# Patient Record
Sex: FEMALE | Race: WHITE | URBAN - METROPOLITAN AREA
[De-identification: names, ages, dates, MRNs, and addresses within clinical notes are randomized per-mention and may not be internally consistent; named-entity substitution may affect disease eponyms.]

---

## 2021-03-24 ENCOUNTER — TELEPHONE ENCOUNTER (OUTPATIENT)
Dept: URBAN - METROPOLITAN AREA SURGERY CENTER 30 | Facility: SURGERY CENTER | Age: 71
End: 2021-03-24

## 2021-03-30 ENCOUNTER — CLAIMS CREATED FROM THE CLAIM WINDOW (OUTPATIENT)
Dept: URBAN - METROPOLITAN AREA CLINIC 118 | Facility: CLINIC | Age: 71
End: 2021-03-30
Payer: COMMERCIAL

## 2021-03-30 ENCOUNTER — LAB OUTSIDE AN ENCOUNTER (OUTPATIENT)
Dept: URBAN - METROPOLITAN AREA CLINIC 118 | Facility: CLINIC | Age: 71
End: 2021-03-30

## 2021-03-30 DIAGNOSIS — K21.9 GASTROESOPHAGEAL REFLUX DISEASE WITHOUT ESOPHAGITIS: ICD-10-CM

## 2021-03-30 DIAGNOSIS — R14.0 ABDOMINAL BLOATING: ICD-10-CM

## 2021-03-30 DIAGNOSIS — R10.12 POSTPRANDIAL ABDOMINAL PAIN IN LEFT UPPER QUADRANT: ICD-10-CM

## 2021-03-30 DIAGNOSIS — I73.89 OTHER SPECIFIED PERIPHERAL VASCULAR DISEASES: ICD-10-CM

## 2021-03-30 PROCEDURE — 99244 OFF/OP CNSLTJ NEW/EST MOD 40: CPT | Performed by: INTERNAL MEDICINE

## 2021-03-30 PROCEDURE — 99204 OFFICE O/P NEW MOD 45 MIN: CPT | Performed by: INTERNAL MEDICINE

## 2021-03-30 RX ORDER — ALPRAZOLAM 0.25 MG/1
(SCHEDULE IV DRUG) TABLET ORAL
Qty: 30 DELAYED RELEASE TABLET | Status: ACTIVE | COMMUNITY

## 2021-03-30 RX ORDER — METOPROLOL TARTRATE 25 MG/1
TABLET ORAL
Qty: 180 DELAYED RELEASE TABLET | Status: ACTIVE | COMMUNITY

## 2021-03-30 RX ORDER — CHLORHEXIDINE GLUCONATE 1.2 MG/ML
RINSE ORAL
Qty: 473 MILLILITER | Status: ACTIVE | COMMUNITY

## 2021-03-30 RX ORDER — OMEPRAZOLE 20 MG/1
1 CAPSULE 30 MINUTES BEFORE MORNING MEAL CAPSULE, DELAYED RELEASE ORAL ONCE A DAY
Status: ACTIVE | COMMUNITY

## 2021-03-30 RX ORDER — ATORVASTATIN CALCIUM, FILM COATED 80 MG/1
TABLET ORAL
Qty: 90 DELAYED RELEASE TABLET | Status: ACTIVE | COMMUNITY

## 2021-03-30 RX ORDER — ASPIRIN 81 MG/1
1 TABLET TABLET, COATED ORAL ONCE A DAY
Status: ACTIVE | COMMUNITY

## 2021-03-30 RX ORDER — VORTIOXETINE 10 MG/1
TABLET, FILM COATED ORAL
Qty: 90 DELAYED RELEASE TABLET | Status: ACTIVE | COMMUNITY

## 2021-03-30 RX ORDER — PANTOPRAZOLE SODIUM 40 MG/1
TABLET, DELAYED RELEASE ORAL
Qty: 180 DELAYED RELEASE TABLET | Status: ACTIVE | COMMUNITY

## 2021-03-30 NOTE — HPI-TODAY'S VISIT:
The patient is a 71 yo female seen with her daughter for a chronic hx of abdominal pain, nausea, and gastritis.  She is referred by Dr Zaragoza for a second opinion for the same.  She has previously seen Dr Cali and had significant testing over the last several years (EGD, Colon, GES, MRI, CT scans).  No significant lesions other than polyps, gastritis, and diverticulosis have been seen.  She states her primary complaints are post-prandial pain with nausea and bloating for "years."  She has gained 10 pounds in the past 6 months, but has a hx of PAD (CAD).  She has regular BMs with only occasional diarrhea, but no constipation, and dietary modification has not helped.  She is on BID protonix with PRN omeprazole, without relief, and states her symptoms were much better on zantac (has not tried pepcid).  She has no family hx of IBD, colon cancer, gastric cancer; her mother and brother had colon polyps.  She does have some food allergies (nuts, chocolate) but her only reaction is mouth sores.  She is former smoker.  Despite her chronic nausea, there is no vomiting, and no blood in the stools, dysphagia, or odynophagia.  Her abdominal pain is diffuse, and is associated with eating/bloating.  She has not tried antispasmodic therapy.

## 2021-03-31 PROBLEM — 840580004: Status: ACTIVE | Noted: 2021-03-30

## 2021-03-31 PROBLEM — 266435005: Status: ACTIVE | Noted: 2021-03-31

## 2021-04-08 ENCOUNTER — OFFICE VISIT (OUTPATIENT)
Dept: URBAN - METROPOLITAN AREA CLINIC 118 | Facility: CLINIC | Age: 71
End: 2021-04-08
Payer: COMMERCIAL

## 2021-04-08 DIAGNOSIS — R10.84 GENERALIZED ABDOMINAL PAIN: ICD-10-CM

## 2021-04-08 DIAGNOSIS — R11.2 NON-INTRACTABLE VOMITING WITH NAUSEA, UNSPECIFIED VOMITING TYPE: ICD-10-CM

## 2021-04-08 DIAGNOSIS — R19.7 DIARRHEA OF PRESUMED INFECTIOUS ORIGIN: ICD-10-CM

## 2021-04-08 PROCEDURE — 99204 OFFICE O/P NEW MOD 45 MIN: CPT | Performed by: INTERNAL MEDICINE

## 2021-04-08 RX ORDER — CHLORHEXIDINE GLUCONATE 1.2 MG/ML
RINSE ORAL
Qty: 473 MILLILITER | Status: ACTIVE | COMMUNITY

## 2021-04-08 RX ORDER — OMEPRAZOLE 20 MG/1
1 CAPSULE 30 MINUTES BEFORE MORNING MEAL CAPSULE, DELAYED RELEASE ORAL ONCE A DAY
Status: DISCONTINUED | COMMUNITY

## 2021-04-08 RX ORDER — ASPIRIN 81 MG/1
1 TABLET TABLET, COATED ORAL ONCE A DAY
Status: ACTIVE | COMMUNITY

## 2021-04-08 RX ORDER — VORTIOXETINE 10 MG/1
TABLET, FILM COATED ORAL
Qty: 90 DELAYED RELEASE TABLET | Status: ACTIVE | COMMUNITY

## 2021-04-08 RX ORDER — ATORVASTATIN CALCIUM, FILM COATED 80 MG/1
TABLET ORAL
Qty: 90 DELAYED RELEASE TABLET | Status: ACTIVE | COMMUNITY

## 2021-04-08 RX ORDER — METOPROLOL TARTRATE 25 MG/1
TABLET ORAL
Qty: 180 DELAYED RELEASE TABLET | Status: ACTIVE | COMMUNITY

## 2021-04-08 RX ORDER — ALPRAZOLAM 0.25 MG/1
(SCHEDULE IV DRUG) TABLET ORAL
Qty: 30 DELAYED RELEASE TABLET | Status: ACTIVE | COMMUNITY

## 2021-04-08 RX ORDER — DICYCLOMINE HYDROCHLORIDE 10 MG/1
1 TABLET CAPSULE ORAL
Qty: 90 TABLET | Refills: 5 | OUTPATIENT
Start: 2021-04-08 | End: 2021-10-05

## 2021-04-08 RX ORDER — PANTOPRAZOLE SODIUM 40 MG/1
TABLET, DELAYED RELEASE ORAL
Qty: 180 DELAYED RELEASE TABLET | Status: ACTIVE | COMMUNITY

## 2021-04-08 RX ORDER — FAMOTIDINE 40 MG/1
1 TABLET AT BEDTIME TABLET, FILM COATED ORAL ONCE A DAY
Qty: 30 TABLET | Refills: 5 | OUTPATIENT
Start: 2021-04-08

## 2021-04-08 NOTE — HPI-TODAY'S VISIT:
The patient is following up after her recent CT scan which was unremarkable other than diverticulosis, a small hiatal hernia, and mild to moderate atherosclerosis.  She has lost 6 pounds and says she continues to have chronic nausea diffuse abdominal cramps, and intermittent watery diarrhea.  She stopped all PPI  antacid therapy and went on daily Pepcid, and this appears to control her reflux at least as well, and has not worsened the diarrhea.  In addition she stopped her atorvastatin after discussing this with her prescribing physician.  A recent CBC as well as urinalysis at her primary care was within normal limits.  A long discussion was held with the patient about functional bowel disorders, anxiety/depression, and organic bowel disease, given the significant number of negative test she has had by both us and previous gastroenterologist.  She has never tried medication for functional bowel disorder; she thinks her anxiety is relatively well controlled on her Trintellix and as needed Xanax.

## 2021-04-13 ENCOUNTER — TELEPHONE ENCOUNTER (OUTPATIENT)
Dept: URBAN - METROPOLITAN AREA CLINIC 118 | Facility: CLINIC | Age: 71
End: 2021-04-13

## 2021-04-13 LAB
ALBUMIN: 4.6
ALKALINE PHOSPHATASE: 126
ALT (SGPT): 12
AST (SGOT): 24
BASO (ABSOLUTE): 0
BASOS: 0
BILIRUBIN, DIRECT: 0.19
BILIRUBIN, TOTAL: 0.6
C DIFFICILE TOXINS A+B, EIA: NEGATIVE
CAMPYLOBACTER CULTURE: (no result)
E COLI SHIGA TOXIN EIA: NEGATIVE
EOS (ABSOLUTE): 0.1
EOS: 2
HEMATOCRIT: 41.9
HEMATOLOGY COMMENTS:: (no result)
HEMOGLOBIN: 13.6
IMMATURE CELLS: (no result)
IMMATURE GRANS (ABS): 0
IMMATURE GRANULOCYTES: 0
LIPASE: 12
LYMPHS (ABSOLUTE): 2.4
LYMPHS: 33
Lab: (no result)
Lab: (no result)
MCH: 28.8
MCHC: 32.5
MCV: 89
MONOCYTES(ABSOLUTE): 0.8
MONOCYTES: 11
NEUTROPHILS (ABSOLUTE): 3.9
NEUTROPHILS: 54
NRBC: (no result)
PLATELETS: 229
PROTEIN, TOTAL: 6.9
RBC: 4.73
RDW: 12.2
SALMONELLA/SHIGELLA SCREEN: (no result)
WBC: 7.2

## 2021-04-13 RX ORDER — DICYCLOMINE HYDROCHLORIDE 10 MG/1
1 TABLET CAPSULE ORAL
Qty: 90 TABLET | Refills: 5
Start: 2021-04-08

## 2021-04-13 RX ORDER — FAMOTIDINE 40 MG/1
1 TABLET AT BEDTIME TABLET, FILM COATED ORAL ONCE A DAY
Qty: 30 TABLET | Refills: 5
Start: 2021-04-08

## 2021-04-14 ENCOUNTER — OFFICE VISIT (OUTPATIENT)
Dept: URBAN - METROPOLITAN AREA CLINIC 118 | Facility: CLINIC | Age: 71
End: 2021-04-14

## 2021-11-12 ENCOUNTER — ERX REFILL RESPONSE (OUTPATIENT)
Dept: URBAN - METROPOLITAN AREA CLINIC 118 | Facility: CLINIC | Age: 71
End: 2021-11-12

## 2021-11-12 RX ORDER — FAMOTIDINE 40 MG/1
TAKE 1 TABLET BY MOUTH EVERYDAY AT BEDTIME TABLET, FILM COATED ORAL
Qty: 30 TABLET | Refills: 6 | OUTPATIENT

## 2021-11-12 RX ORDER — FAMOTIDINE 40 MG/1
TAKE 1 TABLET BY MOUTH EVERYDAY AT BEDTIME TABLET, FILM COATED ORAL ONCE A DAY
Qty: 30 TABLET | Refills: 6 | OUTPATIENT

## 2021-11-21 ENCOUNTER — ERX REFILL RESPONSE (OUTPATIENT)
Dept: URBAN - METROPOLITAN AREA CLINIC 118 | Facility: CLINIC | Age: 71
End: 2021-11-21

## 2021-11-21 RX ORDER — DICYCLOMINE HYDROCHLORIDE 10 MG/1
TAKE 1 CAPSULE BY MOUTH THREE TIMES A DAY AS NEEDED CAPSULE ORAL
Qty: 90 CAPSULE | Refills: 6 | OUTPATIENT

## 2021-11-21 RX ORDER — DICYCLOMINE HYDROCHLORIDE 10 MG/1
1 TABLET CAPSULE ORAL
Qty: 90 TABLET | Refills: 5 | OUTPATIENT

## 2023-07-19 ENCOUNTER — OFFICE VISIT (OUTPATIENT)
Dept: URBAN - METROPOLITAN AREA CLINIC 118 | Facility: CLINIC | Age: 73
End: 2023-07-19

## 2023-10-25 ENCOUNTER — OFFICE VISIT (OUTPATIENT)
Dept: URBAN - METROPOLITAN AREA CLINIC 118 | Facility: CLINIC | Age: 73
End: 2023-10-25

## 2023-12-18 ENCOUNTER — DASHBOARD ENCOUNTERS (OUTPATIENT)
Age: 73
End: 2023-12-18

## 2023-12-18 ENCOUNTER — LAB OUTSIDE AN ENCOUNTER (OUTPATIENT)
Dept: URBAN - METROPOLITAN AREA CLINIC 118 | Facility: CLINIC | Age: 73
End: 2023-12-18

## 2023-12-18 ENCOUNTER — OFFICE VISIT (OUTPATIENT)
Dept: URBAN - METROPOLITAN AREA CLINIC 118 | Facility: CLINIC | Age: 73
End: 2023-12-18
Payer: COMMERCIAL

## 2023-12-18 VITALS
SYSTOLIC BLOOD PRESSURE: 148 MMHG | TEMPERATURE: 97.3 F | WEIGHT: 194.6 LBS | HEART RATE: 58 BPM | HEIGHT: 60 IN | BODY MASS INDEX: 38.21 KG/M2 | DIASTOLIC BLOOD PRESSURE: 66 MMHG

## 2023-12-18 DIAGNOSIS — K31.89 INTESTINAL METAPLASIA OF GASTRIC MUCOSA: ICD-10-CM

## 2023-12-18 DIAGNOSIS — Z86.010 PERSONAL HISTORY OF COLON POLYPS: ICD-10-CM

## 2023-12-18 DIAGNOSIS — R11.0 CHRONIC NAUSEA: ICD-10-CM

## 2023-12-18 DIAGNOSIS — R10.13 DYSPEPSIA: ICD-10-CM

## 2023-12-18 PROCEDURE — 99215 OFFICE O/P EST HI 40 MIN: CPT | Performed by: INTERNAL MEDICINE

## 2023-12-18 RX ORDER — METOPROLOL TARTRATE 25 MG/1
TABLET ORAL
Qty: 180 DELAYED RELEASE TABLET | Status: ACTIVE | COMMUNITY

## 2023-12-18 RX ORDER — PANTOPRAZOLE SODIUM 40 MG/1
TABLET, DELAYED RELEASE ORAL
Qty: 180 DELAYED RELEASE TABLET | Status: DISCONTINUED | COMMUNITY

## 2023-12-18 RX ORDER — SODIUM SULFATE, POTASSIUM SULFATE, MAGNESIUM SULFATE 1.6; 3.13; 17.5 G/177ML; G/177ML; G/177ML
SOLUTION ORAL
Qty: 354 MILLILITER | Status: ACTIVE | COMMUNITY

## 2023-12-18 RX ORDER — VORTIOXETINE 10 MG/1
TABLET, FILM COATED ORAL
Qty: 90 DELAYED RELEASE TABLET | Status: ACTIVE | COMMUNITY

## 2023-12-18 RX ORDER — CHLORHEXIDINE GLUCONATE 1.2 MG/ML
RINSE ORAL
Qty: 473 MILLILITER | Status: ACTIVE | COMMUNITY

## 2023-12-18 RX ORDER — FUROSEMIDE 20 MG/1
TAKE 1 TABLET BY MOUTH EVERY DAY TABLET ORAL
Qty: 90 EACH | Refills: 0 | Status: ACTIVE | COMMUNITY

## 2023-12-18 RX ORDER — METOPROLOL TARTRATE 25 MG/1
TABLET, FILM COATED ORAL
Qty: 180 TABLET | Status: ACTIVE | COMMUNITY

## 2023-12-18 RX ORDER — FLUTICASONE FUROATE, UMECLIDINIUM BROMIDE AND VILANTEROL TRIFENATATE 200; 62.5; 25 UG/1; UG/1; UG/1
POWDER RESPIRATORY (INHALATION)
Qty: 180 EACH | Status: ACTIVE | COMMUNITY

## 2023-12-18 RX ORDER — ATORVASTATIN CALCIUM, FILM COATED 80 MG/1
TABLET ORAL
Qty: 90 DELAYED RELEASE TABLET | Status: ACTIVE | COMMUNITY

## 2023-12-18 RX ORDER — ATORVASTATIN CALCIUM, FILM COATED 40 MG/1
TAKE 1 TABLET BY MOUTH EVERY DAY TABLET ORAL
Qty: 90 EACH | Refills: 1 | Status: ACTIVE | COMMUNITY

## 2023-12-18 RX ORDER — ASPIRIN 81 MG/1
1 TABLET TABLET, COATED ORAL ONCE A DAY
Status: ACTIVE | COMMUNITY

## 2023-12-18 RX ORDER — FAMOTIDINE 40 MG/1
TAKE 1 TABLET BY MOUTH EVERYDAY AT BEDTIME TABLET, FILM COATED ORAL
Qty: 30 TABLET | Refills: 6 | Status: ACTIVE | COMMUNITY

## 2023-12-18 RX ORDER — ESCITALOPRAM OXALATE 10 MG/1
TABLET ORAL
Qty: 90 TABLET | Status: ACTIVE | COMMUNITY

## 2023-12-18 RX ORDER — ESOMEPRAZOLE MAGNESIUM 40 MG/1
1 CAPSULE CAPSULE, DELAYED RELEASE ORAL ONCE A DAY
Qty: 30 | Refills: 11 | OUTPATIENT
Start: 2023-12-18

## 2023-12-18 RX ORDER — ALPRAZOLAM 0.25 MG/1
(SCHEDULE IV DRUG) TABLET ORAL
Qty: 30 DELAYED RELEASE TABLET | Status: ACTIVE | COMMUNITY

## 2023-12-18 RX ORDER — DICYCLOMINE HYDROCHLORIDE 10 MG/1
TAKE 1 CAPSULE BY MOUTH THREE TIMES A DAY AS NEEDED CAPSULE ORAL
Qty: 90 CAPSULE | Refills: 6 | Status: ACTIVE | COMMUNITY

## 2023-12-18 NOTE — HPI-TODAY'S VISIT:
patient reports nausea and vomiting  postprandial was on omeprazole for 8+ weeks no help Had gastric IM 2019 4/2021 The patient is following up after her recent CT scan which was unremarkable other than diverticulosis, a small hiatal hernia, and mild to moderate atherosclerosis.  She has lost 6 pounds and says she continues to have chronic nausea diffuse abdominal cramps, and intermittent watery diarrhea.  She stopped all PPI  antacid therapy and went on daily Pepcid, and this appears to control her reflux at least as well, and has not worsened the diarrhea.  In addition she stopped her atorvastatin after discussing this with her prescribing physician.  A recent CBC as well as urinalysis at her primary care was within normal limits.  A long discussion was held with the patient about functional bowel disorders, anxiety/depression, and organic bowel disease, given the significant number of negative test she has had by both us and previous gastroenterologist.  She has never tried medication for functional bowel disorder; she thinks her anxiety is relatively well controlled on her Trintellix and as needed Xanax.

## 2024-01-09 ENCOUNTER — CLAIMS CREATED FROM THE CLAIM WINDOW (OUTPATIENT)
Dept: URBAN - METROPOLITAN AREA CLINIC 4 | Facility: CLINIC | Age: 74
End: 2024-01-09
Payer: COMMERCIAL

## 2024-01-09 ENCOUNTER — OFFICE VISIT (OUTPATIENT)
Dept: URBAN - METROPOLITAN AREA SURGERY CENTER 23 | Facility: SURGERY CENTER | Age: 74
End: 2024-01-09
Payer: COMMERCIAL

## 2024-01-09 ENCOUNTER — OFFICE VISIT (OUTPATIENT)
Dept: URBAN - METROPOLITAN AREA SURGERY CENTER 23 | Facility: SURGERY CENTER | Age: 74
End: 2024-01-09

## 2024-01-09 DIAGNOSIS — Z86.010 ADENOMAS PERSONAL HISTORY OF COLONIC POLYPS: ICD-10-CM

## 2024-01-09 DIAGNOSIS — Z86.010 PERSONAL HISTORY OF COLONIC POLYP: ICD-10-CM

## 2024-01-09 DIAGNOSIS — K31.A0 GASTRIC INTESTINAL METAPLASIA, UNSPECIFIED: ICD-10-CM

## 2024-01-09 DIAGNOSIS — K29.70 GASTRITIS, UNSPECIFIED, WITHOUT BLEEDING: ICD-10-CM

## 2024-01-09 DIAGNOSIS — K31.89 OTHER DISEASES OF STOMACH AND DUODENUM: ICD-10-CM

## 2024-01-09 DIAGNOSIS — K29.60 OTHER GASTRITIS WITHOUT BLEEDING: ICD-10-CM

## 2024-01-09 DIAGNOSIS — R10.13 EPIGASTRIC ABDOMINAL PAIN: ICD-10-CM

## 2024-01-09 DIAGNOSIS — R10.13 ABDOMINAL DISCOMFORT, EPIGASTRIC: ICD-10-CM

## 2024-01-09 DIAGNOSIS — K29.70 GASTRITIS: ICD-10-CM

## 2024-01-09 DIAGNOSIS — K57.30 DIVERTICULA OF COLON: ICD-10-CM

## 2024-01-09 DIAGNOSIS — K31.7 GASTRIC POLYP: ICD-10-CM

## 2024-01-09 PROCEDURE — 00813 ANES UPR LWR GI NDSC PX: CPT | Performed by: NURSE ANESTHETIST, CERTIFIED REGISTERED

## 2024-01-09 PROCEDURE — G0105 COLORECTAL SCRN; HI RISK IND: HCPCS | Performed by: INTERNAL MEDICINE

## 2024-01-09 PROCEDURE — 43239 EGD BIOPSY SINGLE/MULTIPLE: CPT | Performed by: INTERNAL MEDICINE

## 2024-01-09 PROCEDURE — 88342 IMHCHEM/IMCYTCHM 1ST ANTB: CPT | Performed by: PATHOLOGY

## 2024-01-09 PROCEDURE — 88312 SPECIAL STAINS GROUP 1: CPT | Performed by: PATHOLOGY

## 2024-01-09 PROCEDURE — 88305 TISSUE EXAM BY PATHOLOGIST: CPT | Performed by: PATHOLOGY

## 2024-01-09 PROCEDURE — G8907 PT DOC NO EVENTS ON DISCHARG: HCPCS | Performed by: INTERNAL MEDICINE

## 2024-01-09 RX ORDER — ATORVASTATIN CALCIUM, FILM COATED 80 MG/1
TABLET ORAL
Qty: 90 DELAYED RELEASE TABLET | Status: ACTIVE | COMMUNITY

## 2024-01-09 RX ORDER — ATORVASTATIN CALCIUM, FILM COATED 40 MG/1
TAKE 1 TABLET BY MOUTH EVERY DAY TABLET ORAL
Qty: 90 EACH | Refills: 1 | Status: ACTIVE | COMMUNITY

## 2024-01-09 RX ORDER — FLUTICASONE FUROATE, UMECLIDINIUM BROMIDE AND VILANTEROL TRIFENATATE 200; 62.5; 25 UG/1; UG/1; UG/1
POWDER RESPIRATORY (INHALATION)
Qty: 180 EACH | Status: ACTIVE | COMMUNITY

## 2024-01-09 RX ORDER — ESOMEPRAZOLE MAGNESIUM 40 MG/1
1 CAPSULE CAPSULE, DELAYED RELEASE ORAL ONCE A DAY
Qty: 30 | Refills: 11 | Status: ACTIVE | COMMUNITY
Start: 2023-12-18

## 2024-01-09 RX ORDER — DICYCLOMINE HYDROCHLORIDE 10 MG/1
TAKE 1 CAPSULE BY MOUTH THREE TIMES A DAY AS NEEDED CAPSULE ORAL
Qty: 90 CAPSULE | Refills: 6 | Status: ACTIVE | COMMUNITY

## 2024-01-09 RX ORDER — SODIUM SULFATE, POTASSIUM SULFATE, MAGNESIUM SULFATE 1.6; 3.13; 17.5 G/177ML; G/177ML; G/177ML
SOLUTION ORAL
Qty: 354 MILLILITER | Status: ACTIVE | COMMUNITY

## 2024-01-09 RX ORDER — METOPROLOL TARTRATE 25 MG/1
TABLET, FILM COATED ORAL
Qty: 180 TABLET | Status: ACTIVE | COMMUNITY

## 2024-01-09 RX ORDER — FAMOTIDINE 40 MG/1
TAKE 1 TABLET BY MOUTH EVERYDAY AT BEDTIME TABLET, FILM COATED ORAL
Qty: 30 TABLET | Refills: 6 | Status: ACTIVE | COMMUNITY

## 2024-01-09 RX ORDER — FUROSEMIDE 20 MG/1
TAKE 1 TABLET BY MOUTH EVERY DAY TABLET ORAL
Qty: 90 EACH | Refills: 0 | Status: ACTIVE | COMMUNITY

## 2024-01-09 RX ORDER — ASPIRIN 81 MG/1
1 TABLET TABLET, COATED ORAL ONCE A DAY
Status: ACTIVE | COMMUNITY

## 2024-01-09 RX ORDER — ALPRAZOLAM 0.25 MG/1
(SCHEDULE IV DRUG) TABLET ORAL
Qty: 30 DELAYED RELEASE TABLET | Status: ACTIVE | COMMUNITY

## 2024-01-09 RX ORDER — ESCITALOPRAM OXALATE 10 MG/1
TABLET ORAL
Qty: 90 TABLET | Status: ACTIVE | COMMUNITY

## 2024-01-09 RX ORDER — METOPROLOL TARTRATE 25 MG/1
TABLET ORAL
Qty: 180 DELAYED RELEASE TABLET | Status: ACTIVE | COMMUNITY

## 2024-01-09 RX ORDER — VORTIOXETINE 10 MG/1
TABLET, FILM COATED ORAL
Qty: 90 DELAYED RELEASE TABLET | Status: ACTIVE | COMMUNITY

## 2024-01-09 RX ORDER — CHLORHEXIDINE GLUCONATE 1.2 MG/ML
RINSE ORAL
Qty: 473 MILLILITER | Status: ACTIVE | COMMUNITY

## 2024-01-29 ENCOUNTER — P2P PATIENT RECORD (OUTPATIENT)
Age: 74
End: 2024-01-29